# Patient Record
Sex: FEMALE | Race: WHITE | URBAN - METROPOLITAN AREA
[De-identification: names, ages, dates, MRNs, and addresses within clinical notes are randomized per-mention and may not be internally consistent; named-entity substitution may affect disease eponyms.]

---
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
## 2022-07-25 NOTE — PATIENT INSTRUCTIONS
Somewhere Desk: [phone]       Well Care - Tips for Teens: Care Instructions  Your Care Instructions  Being a teen can be exciting and tough. You are finding your place in the world. And you may have a lot on your mind these days too-school, friends, sports, parents, and maybe even how you look. Some teens begin to feel the effects of stress, such as headaches, neck or back pain, or an upset stomach. To feel your best, it is important to start good health habits now. Follow-up care is a key part of your treatment and safety. Be sure to make and go to all appointments, and call your doctor if you are having problems. It's also a good idea to know your test results and keep a list of the medicines you take. How can you care for yourself at home? Staying healthy can help you cope with stress or depression. Here are some tips to keep you healthy. · Get at least 30 minutes of exercise on most days of the week. Walking is a good choice. You also may want to do other activities, such as running, swimming, cycling, or playing tennis or team sports. · Try cutting back on time spent on TV or video games each day. · Munch at least 5 helpings of fruits and veggies. A helping is a piece of fruit or ½ cup of vegetables. · Cut back to 1 can or small cup of soda or juice drink a day. Try water and milk instead. · Cheese, yogurt, milk-have at least 3 cups a day to get the calcium you need. · The decision to have sex is a serious one that only you can make. Not having sex is the best way to prevent HIV, STIs (sexually transmitted infections), and pregnancy. · If you do choose to have sex, condoms and birth control can increase your chances of protection against STIs and pregnancy. · Talk to an adult you feel comfortable with. Confide in this person and ask for his or her advice.  This can be a parent, a teacher, a , or someone else you trust.  Healthy ways to deal with stress  · Get 9 to 10 hours of sleep [Dear  ___] : Dear  [unfilled], every night. · Eat healthy meals. · Go for a long walk. · Dance. Shoot hoops. Go for a bike ride. Get some exercise. · Talk with someone you trust.  · Laugh, cry, sing, or write in a journal.  When should you call for help? Call 911 anytime you think you may need emergency care. For example, call if:    · You feel life is meaningless or think about killing yourself.   Keyur Moores to a counselor or doctor if any of the following problems lasts for 2 or more weeks.    · You feel sad a lot or cry all the time.     · You have trouble sleeping or sleep too much.     · You find it hard to concentrate, make decisions, or remember things.     · You change how you normally eat.     · You feel guilty for no reason. Where can you learn more? Go to http://charli-erin.info/. Enter S741 in the search box to learn more about \"Well Care - Tips for Teens: Care Instructions. \"  Current as of: May 12, 2017  Content Version: 11.7  © 4216-3374 [employer]. Care instructions adapted under license by [employer] (which disclaims liability or warranty for this information). If you have questions about a medical condition or this instruction, always ask your healthcare professional. [address] any warranty or liability for your use of this information. [Consult Letter:] : I had the pleasure of evaluating your patient, [unfilled]. [Please see my note below.] : Please see my note below. [Consult Closing:] : Thank you very much for allowing me to participate in the care of this patient.  If you have any questions, please do not hesitate to contact me. [Sincerely,] : Sincerely, [FreeTextEntry3] : Zac Siddiqui M.D., Ph.D. DPN-N\par [facility]\par [facility]\par Medical Director of Stroke Services\par [facility]\par

## 2023-01-17 ENCOUNTER — OFFICE VISIT (OUTPATIENT)
Dept: OBGYN CLINIC | Age: 23
End: 2023-01-17
Payer: COMMERCIAL

## 2023-01-17 VITALS — SYSTOLIC BLOOD PRESSURE: 119 MMHG | DIASTOLIC BLOOD PRESSURE: 81 MMHG | WEIGHT: 200 LBS

## 2023-01-17 DIAGNOSIS — Z11.3 SCREENING EXAMINATION FOR SEXUALLY TRANSMITTED DISEASE: ICD-10-CM

## 2023-01-17 DIAGNOSIS — Z01.419 WELL FEMALE EXAM WITH ROUTINE GYNECOLOGICAL EXAM: Primary | ICD-10-CM

## 2023-01-17 PROCEDURE — 99395 PREV VISIT EST AGE 18-39: CPT | Performed by: OBSTETRICS & GYNECOLOGY

## 2023-01-17 RX ORDER — LISDEXAMFETAMINE DIMESYLATE 60 MG/1
CAPSULE ORAL
COMMUNITY
Start: 2022-12-14

## 2023-01-17 NOTE — PROGRESS NOTES
Annual exam  Bijan Lee is a ,  25 y.o. female   Patient's last menstrual period was 2022 (exact date). She presents for her annual checkup. She is having no significant problems. Sexual history:    She  reports being sexually active and has had partner(s) who are male. She reports using the following method of birth control/protection: Condom. Per Nursing Note:  Patient's last menstrual period was 2022 (exact date). Her periods are normal in flow and usually regular with a 26-32 day interval with 3-7 day duration. She has dysmenorrhea. Problems: significant PMS  Birth Control: condoms always. Last Pap: pap performed today  She does not have a history of KATY 2, 3 or cervical cancer. With regard to the Gardisil vaccine, she has received all 3 injections. She wants STD testing today. Past Medical History:   Diagnosis Date    Autism     HPV vaccine counseling     Gardasil series completed      Past Surgical History:   Procedure Laterality Date    HX WISDOM TEETH EXTRACTION         Current Outpatient Medications   Medication Sig Dispense Refill    Vyvanse 60 mg capsule       FLUoxetine (PROZAC) 20 mg capsule       multivitamin (ONE A DAY) tablet Take 1 Tab by mouth daily. VYVANSE 50 mg cap  (Patient not taking: Reported on 2023)       Allergies: Patient has no known allergies. Tobacco History:  reports that she has never smoked. She has never used smokeless tobacco.  Alcohol Abuse:  reports no history of alcohol use. Drug Abuse:  reports no history of drug use.     Family Medical/Cancer History:   Family History   Problem Relation Age of Onset    Diabetes Paternal Grandmother         Type 2        Review of Systems - History obtained from the patient  Constitutional: negative for weight loss, fever, night sweats  HEENT: negative for hearing loss, earache, congestion, snoring, sorethroat  CV: negative for chest pain, palpitations, edema  Resp: negative for cough, shortness of breath, wheezing  GI: negative for change in bowel habits, abdominal pain, black or bloody stools  : negative for frequency, dysuria, hematuria, vaginal discharge  MSK: negative for back pain, joint pain, muscle pain  Breast: negative for breast lumps, nipple discharge, galactorrhea  Skin :negative for itching, rash, hives  Neuro: negative for dizziness, headache, confusion, weakness  Psych: negative for anxiety, depression, change in mood  Heme/lymph: negative for bleeding, bruising, pallor    Physical Exam    Visit Vitals  /81   Wt 200 lb (90.7 kg)   LMP 12/25/2022 (Exact Date)       Constitutional  Appearance: well-nourished, well developed, alert, in no acute distress    HENT  Head and Face: appears normal    Neck  Inspection/Palpation: normal appearance, no masses or tenderness  Lymph Nodes: no lymphadenopathy present  Thyroid: gland size normal, nontender, no nodules or masses present on palpation    Chest  Respiratory Effort: breathing unlabored    Breasts  Inspection of Breasts: breasts symmetrical, no skin changes, no discharge present, nipple appearance normal, no skin retraction present  Palpation of Breasts and Axillae: no masses present on palpation, no breast tenderness  Axillary Lymph Nodes: no lymphadenopathy present    Gastrointestinal  Abdominal Examination: abdomen non-tender to palpation, normal bowel sounds, no masses present  Liver and spleen: no hepatomegaly present, spleen not palpable  Hernias: no hernias identified    Genitourinary  External Genitalia: normal appearance for age, no discharge present, no tenderness present, no inflammatory lesions present, no masses present, no atrophy present  Vagina: normal vaginal vault without central or paravaginal defects, no discharge present, no inflammatory lesions present, no masses present  Bladder: non-tender to palpation  Urethra: appears normal  Cervix: normal   Uterus: normal size, shape and consistency  Adnexa: no adnexal tenderness present, no adnexal masses present  Perineum: perineum within normal limits, no evidence of trauma, no rashes or skin lesions present  Anus: anus within normal limits, no hemorrhoids present  Inguinal Lymph Nodes: no lymphadenopathy present    Skin  General Inspection: no rash, no lesions identified    Neurologic/Psychiatric  Mental Status:  Orientation: grossly oriented to person, place and time  Mood and Affect: mood normal, affect appropriate    Assessment:  Routine gynecologic examination  Her current medical status is satisfactory with no evidence of significant gynecologic issues. Plan:  Counseled re: diet, exercise, healthy lifestyle  Return for yearly wellness visits  Gardisil counseling provided  RTO for Nexplanon insertion.

## 2023-01-17 NOTE — PROGRESS NOTES
Michelle Rodrigues is a 25 y.o. female returns for an annual exam     Chief Complaint   Patient presents with    Well Woman       Patient's last menstrual period was 12/25/2022 (exact date). Her periods are normal in flow and usually regular with a 26-32 day interval with 3-7 day duration. She has dysmenorrhea. Problems: significant PMS  Birth Control: condoms always. Last Pap: pap performed today  She does not have a history of KATY 2, 3 or cervical cancer. With regard to the Gardisil vaccine, she has received all 3 injections. She wants STD testing today. 1. Have you been to the ER, urgent care clinic, or hospitalized since your last visit? No    2. Have you seen or consulted any other health care providers outside of the [address] [place] [address] since your last visit? No    Examination chaperoned by Antwon López CMA.

## 2023-01-18 LAB
HBV SURFACE AG SER QL: <0.1 INDEX
HBV SURFACE AG SER QL: NEGATIVE
HCV AB SERPL QL IA: NONREACTIVE
HIV 1+2 AB+HIV1 P24 AG SERPL QL IA: NONREACTIVE
HIV12 RESULT COMMENT, HHIVC: NORMAL
RPR SER QL: NONREACTIVE

## 2023-01-21 LAB
C TRACH RRNA CVX QL NAA+PROBE: POSITIVE
CYTOLOGIST CVX/VAG CYTO: ABNORMAL
CYTOLOGY CVX/VAG DOC CYTO: ABNORMAL
CYTOLOGY CVX/VAG DOC THIN PREP: ABNORMAL
DX ICD CODE: ABNORMAL
DX ICD CODE: ABNORMAL
LABCORP, 190119: ABNORMAL
Lab: ABNORMAL
N GONORRHOEA RRNA CVX QL NAA+PROBE: NEGATIVE
OTHER STN SPEC: ABNORMAL
PATHOLOGIST CVX/VAG CYTO: ABNORMAL
STAT OF ADQ CVX/VAG CYTO-IMP: ABNORMAL

## 2023-01-23 RX ORDER — DOXYCYCLINE 100 MG/1
100 TABLET ORAL 2 TIMES DAILY
Qty: 14 TABLET | Refills: 0 | Status: SHIPPED | OUTPATIENT
Start: 2023-01-23 | End: 2023-01-30

## 2023-01-26 NOTE — PROGRESS NOTES
Lyudmila Lopez is a 25 y.o. female presents for a problem visit. Chief Complaint   Patient presents with    Nexplanon       Problems:  none      Patient's last menstrual period was 01/23/2023. Birth Control: condoms. Last Pap: abnormal obtained 1/17/23        1. Have you been to the ER, urgent care clinic, or hospitalized since your last visit? No    2. Have you seen or consulted any other health care providers outside of the [address] [place] [address] since your last visit? No    Device number [id number], expiration date 10/24/2024    Chart reviewed for the following:   Ray BEAN, have reviewed the History, Physical and updated the Allergic reactions for [address] [facility] [address] performed immediately prior to start of procedure:   Hafsa BEAN CMA, have performed the following reviews on Lyudmila Lopez prior to the start of the procedure:            * Patient was identified by name and date of birth   * Agreement on procedure being performed was verified  * Risks and Benefits explained to the patient  * Procedure site verified and marked as necessary  * Patient was positioned for comfort  * Consent was signed and verified     Time: 950am    Date of procedure: 1/27/23    Procedure performed by:  Monico Monroe MD    Provider assisted by: Elly Bence, CMA     Patient assisted by: self    How tolerated by patient: tolerated the procedure well with no complications    Post Procedural Pain Scale: 0 - No Hurt    Comments: none    Examination chaperoned by Hafsa Loya CMA.

## 2023-01-27 ENCOUNTER — OFFICE VISIT (OUTPATIENT)
Dept: OBGYN CLINIC | Age: 23
End: 2023-01-27
Payer: COMMERCIAL

## 2023-01-27 DIAGNOSIS — Z30.017 NEXPLANON INSERTION: Primary | ICD-10-CM

## 2023-01-27 LAB
HCG URINE, QL. (POC): NEGATIVE
VALID INTERNAL CONTROL?: YES

## 2023-01-27 NOTE — PROGRESS NOTES
Procedure note: Nexplanon insertion    Pedro Henriquez is a [de-identified] P5,  25 y.o. female [address] [facility] whose Patient's last menstrual period was 01/23/2023. was on 1/23/2023 presents for office insertion of an [address] sub-dermal contraceptive implant. She has had an opportunity to read the [address] \"Patient Labeling and Consent Form\". We counseled Leeanne Jordan about the insertion and removal procedures as well as potential side-effects, benefits and risks. She state she had no further questions and signed the consent form. She confirmed that she has no allergies to Betadine or Xylocaine. She reclined on the examination table in the supine position with her left arm flexed at the elbow and externally rotated. The insertion site was identified and marked approximately 6-8 cm proximal to the elbow crease at the inner side of the upper arm over the triceps muscle below the groove between the biceps and triceps muscles. A second leroy was placed 6-8 cm above the first leroy. The insertion site was cleansed with Betadine antiseptic. Approximately 5 ml of 2% xylocaine without epinephrine were injected just under the skin along the planned insertion canal. The NEXPLANON sterile applicator was carefully removed from its blister pack and kept sterile. I removed the needle cap. I visually verified the presence of NEXPLANON inside the needle tip. The skin at the insertion site was then stretched by my thumb and index finger. I then inserted the needle tip through the skin at the appropriate angle to the skin surface, just until the skin has been penetrated. The needle was gently inserted to its full length. The slider was then pushed all the way and then released. I then removed the needle and palpated the implant in the appropriate location. The patient also palpated the implant in place. Both Leeanne Jordan and I were able to confirm the presence of the [address] in its subdermal location by palpation.    The skin was cleansed and dried. A small adhesive bandage was placed over the insertion site and then wrapped a pressure bandage over the site. There were no complication or problems. She demonstrated full active range of movement of her elbow, wrist, all five digits and denied numbness and tingling. Post-procedure:  She was told to remove the pressure dressing in 12-24 hours, to keep the incision area dry for 24 hours and to remove the Steristrip in several days. She was given our 24-hour phone number and encouraged to call if there are any problems. The patient User Card and Patient Chart Label were filled in. She was given the User Card for her records. She was advised to have the Annmarie Fillers removed or replaced three years from today's date. Results of pap and Nuswab reviewed. Doxycycline sent to pharmacy. Discussed importance of partner being treated before she has intercourse again.

## 2023-03-02 NOTE — PROGRESS NOTES
Maynor Kaiser is a 25 y.o. female presents for a problem visit. Chief Complaint   Patient presents with    Follow-up     SALUD     No LMP recorded. Birth Control: nexplanon. Last Pap: abnormal obtained 1/2023    The patient is reporting having: SALUD today. She tested positive for chlamydia on 1/17/2023.        1. Have you been to the ER, urgent care clinic, or hospitalized since your last visit? No    2. Have you seen or consulted any other health care providers outside of the [address] [place] [address] since your last visit? No    Examination chaperoned by Kassie Torres CMA.

## 2023-03-03 ENCOUNTER — OFFICE VISIT (OUTPATIENT)
Dept: OBGYN CLINIC | Age: 23
End: 2023-03-03

## 2023-03-03 DIAGNOSIS — Z20.2 EXPOSURE TO SEXUALLY TRANSMITTED DISEASE (STD): Primary | ICD-10-CM

## 2023-03-03 DIAGNOSIS — Z86.19 HISTORY OF CHLAMYDIA: ICD-10-CM

## 2023-03-03 NOTE — PROGRESS NOTES
GYN Problem Visit Note    Chief Complaint   Follow-up (SALUD)   No LMP recorded. Bethel Lee is a 25 y.o. female who complains of   Chief Complaint   Patient presents with    Follow-up     SALUD     Patient was treated for chlamydia in January. She returns today for test of cure. Per Nursing Note:  No LMP recorded. Birth Control: nexplanon. Last Pap: abnormal obtained 1/2023     The patient is reporting having: SALUD today. She tested positive for chlamydia on 1/17/2023. Past Medical History:   Diagnosis Date    Autism     HPV vaccine counseling     Gardasil series completed      Past Surgical History:   Procedure Laterality Date    HX WISDOM TEETH EXTRACTION       Social History     Occupational History    Not on file   Tobacco Use    Smoking status: Never    Smokeless tobacco: Never    Tobacco comments:     Never used vapor or e-cigs    Substance and Sexual Activity    Alcohol use: No    Drug use: No    Sexual activity: Yes     Partners: Male     Birth control/protection: Condom     Family History   Problem Relation Age of Onset    Diabetes Paternal Grandmother         Type 2       No Known Allergies  Prior to Admission medications    Medication Sig Start Date End Date Taking? Authorizing Provider   Vyvanse 60 mg capsule  12/14/22  Yes Provider, Historical   FLUoxetine (PROZAC) 20 mg capsule  6/27/18  Yes Provider, Historical   multivitamin (ONE A DAY) tablet Take 1 Tab by mouth daily.    Yes Provider, Historical   VYVANSE 50 mg cap  6/28/18   Provider, Historical        Review of Systems: History obtained from the patient  Constitutional: negative for weight loss, fever, night sweats  Breast: negative for breast lumps, nipple discharge, galactorrhea  GI: negative for change in bowel habits, abdominal pain, black or bloody stools  : negative for frequency, dysuria, hematuria, vaginal discharge  MSK: negative for back pain, joint pain, muscle pain  Skin: negative for itching, rash, hives  Psych: negative for anxiety, depression, change in mood      Objective: There were no vitals taken for this visit. Physical Exam:   PHYSICAL EXAMINATION    Constitutional  Appearance: well-nourished, well developed, alert, in no acute distress    Gastrointestinal  Abdominal Examination: abdomen non-tender to palpation, normal bowel sounds, no masses present  Liver and spleen: no hepatomegaly present, spleen not palpable  Hernias: no hernias identified    Skin  General Inspection: no rash, no lesions identified    Neurologic/Psychiatric  Mental Status:  Orientation: grossly oriented to person, place and time  Mood and Affect: mood normal, affect appropriate      ASSESSMENT/PLAN:    ICD-10-CM ICD-9-CM    1. Exposure to sexually transmitted disease (STD)  Z20.2 V01.6 CT/NG/T.VAGINALIS AMPLIFICATION      2. History of chlamydia  Z86.19 V12.09 CT/NG/T.VAGINALIS AMPLIFICATION          Orders Placed This Encounter    CT/NG/T.VAGINALIS AMPLIFICATION     Order Specific Question:   Specimen source     Answer:   Urine [258]     RTO prn if symptoms persist or worsen. Instructions given to pt. Handouts given to pt.

## 2023-05-24 RX ORDER — FLUOXETINE HYDROCHLORIDE 20 MG/1
CAPSULE ORAL
COMMUNITY
Start: 2018-06-27

## 2023-05-24 RX ORDER — LISDEXAMFETAMINE DIMESYLATE 60 MG/1
CAPSULE ORAL
COMMUNITY
Start: 2022-12-14